# Patient Record
Sex: FEMALE | Race: WHITE | NOT HISPANIC OR LATINO | Employment: UNEMPLOYED | URBAN - METROPOLITAN AREA
[De-identification: names, ages, dates, MRNs, and addresses within clinical notes are randomized per-mention and may not be internally consistent; named-entity substitution may affect disease eponyms.]

---

## 2021-05-25 ENCOUNTER — HOSPITAL ENCOUNTER (EMERGENCY)
Facility: MEDICAL CENTER | Age: 26
End: 2021-05-25
Attending: EMERGENCY MEDICINE

## 2021-05-25 ENCOUNTER — APPOINTMENT (OUTPATIENT)
Dept: RADIOLOGY | Facility: MEDICAL CENTER | Age: 26
End: 2021-05-25
Attending: EMERGENCY MEDICINE

## 2021-05-25 VITALS
BODY MASS INDEX: 38.18 KG/M2 | SYSTOLIC BLOOD PRESSURE: 129 MMHG | TEMPERATURE: 97.3 F | HEART RATE: 73 BPM | DIASTOLIC BLOOD PRESSURE: 82 MMHG | HEIGHT: 60 IN | OXYGEN SATURATION: 96 % | RESPIRATION RATE: 16 BRPM | WEIGHT: 194.45 LBS

## 2021-05-25 DIAGNOSIS — J37.0 LARYNGITIS, CHRONIC: ICD-10-CM

## 2021-05-25 DIAGNOSIS — R13.10 ODYNOPHAGIA: ICD-10-CM

## 2021-05-25 LAB
ALBUMIN SERPL BCP-MCNC: 4.5 G/DL (ref 3.2–4.9)
ALBUMIN/GLOB SERPL: 1.5 G/DL
ALP SERPL-CCNC: 87 U/L (ref 30–99)
ALT SERPL-CCNC: 16 U/L (ref 2–50)
ANION GAP SERPL CALC-SCNC: 11 MMOL/L (ref 7–16)
AST SERPL-CCNC: 18 U/L (ref 12–45)
BASOPHILS # BLD AUTO: 0.4 % (ref 0–1.8)
BASOPHILS # BLD: 0.05 K/UL (ref 0–0.12)
BILIRUB SERPL-MCNC: 0.2 MG/DL (ref 0.1–1.5)
BUN SERPL-MCNC: 14 MG/DL (ref 8–22)
CALCIUM SERPL-MCNC: 9.1 MG/DL (ref 8.4–10.2)
CHLORIDE SERPL-SCNC: 105 MMOL/L (ref 96–112)
CO2 SERPL-SCNC: 23 MMOL/L (ref 20–33)
CREAT SERPL-MCNC: 0.72 MG/DL (ref 0.5–1.4)
EOSINOPHIL # BLD AUTO: 0.14 K/UL (ref 0–0.51)
EOSINOPHIL NFR BLD: 1.3 % (ref 0–6.9)
ERYTHROCYTE [DISTWIDTH] IN BLOOD BY AUTOMATED COUNT: 45.8 FL (ref 35.9–50)
GLOBULIN SER CALC-MCNC: 3.1 G/DL (ref 1.9–3.5)
GLUCOSE SERPL-MCNC: 87 MG/DL (ref 65–99)
HCT VFR BLD AUTO: 47.1 % (ref 37–47)
HGB BLD-MCNC: 15.4 G/DL (ref 12–16)
IMM GRANULOCYTES # BLD AUTO: 0.05 K/UL (ref 0–0.11)
IMM GRANULOCYTES NFR BLD AUTO: 0.4 % (ref 0–0.9)
LYMPHOCYTES # BLD AUTO: 2.6 K/UL (ref 1–4.8)
LYMPHOCYTES NFR BLD: 23.4 % (ref 22–41)
MCH RBC QN AUTO: 30.3 PG (ref 27–33)
MCHC RBC AUTO-ENTMCNC: 32.7 G/DL (ref 33.6–35)
MCV RBC AUTO: 92.5 FL (ref 81.4–97.8)
MONOCYTES # BLD AUTO: 0.72 K/UL (ref 0–0.85)
MONOCYTES NFR BLD AUTO: 6.5 % (ref 0–13.4)
NEUTROPHILS # BLD AUTO: 7.56 K/UL (ref 2–7.15)
NEUTROPHILS NFR BLD: 68 % (ref 44–72)
NRBC # BLD AUTO: 0 K/UL
NRBC BLD-RTO: 0 /100 WBC
PLATELET # BLD AUTO: 207 K/UL (ref 164–446)
PMV BLD AUTO: 11.1 FL (ref 9–12.9)
POTASSIUM SERPL-SCNC: 4.5 MMOL/L (ref 3.6–5.5)
PROT SERPL-MCNC: 7.6 G/DL (ref 6–8.2)
RBC # BLD AUTO: 5.09 M/UL (ref 4.2–5.4)
SODIUM SERPL-SCNC: 139 MMOL/L (ref 135–145)
WBC # BLD AUTO: 11.1 K/UL (ref 4.8–10.8)

## 2021-05-25 PROCEDURE — 80053 COMPREHEN METABOLIC PANEL: CPT

## 2021-05-25 PROCEDURE — 85025 COMPLETE CBC W/AUTO DIFF WBC: CPT

## 2021-05-25 PROCEDURE — 70491 CT SOFT TISSUE NECK W/DYE: CPT

## 2021-05-25 PROCEDURE — 700117 HCHG RX CONTRAST REV CODE 255: Performed by: EMERGENCY MEDICINE

## 2021-05-25 PROCEDURE — 99283 EMERGENCY DEPT VISIT LOW MDM: CPT

## 2021-05-25 PROCEDURE — 36415 COLL VENOUS BLD VENIPUNCTURE: CPT

## 2021-05-25 RX ORDER — OMEPRAZOLE 40 MG/1
40 CAPSULE, DELAYED RELEASE ORAL DAILY
Qty: 30 CAPSULE | Refills: 1 | Status: SHIPPED | OUTPATIENT
Start: 2021-05-25

## 2021-05-25 RX ORDER — ACETAMINOPHEN AND CODEINE PHOSPHATE 120; 12 MG/5ML; MG/5ML
1 SOLUTION ORAL EVERY EVENING
COMMUNITY

## 2021-05-25 RX ORDER — ALBUTEROL SULFATE 90 UG/1
2 AEROSOL, METERED RESPIRATORY (INHALATION) EVERY 6 HOURS PRN
COMMUNITY

## 2021-05-25 RX ORDER — BENZONATATE 200 MG/1
200 CAPSULE ORAL 3 TIMES DAILY PRN
Qty: 30 CAPSULE | Refills: 0 | Status: SHIPPED | OUTPATIENT
Start: 2021-05-25

## 2021-05-25 RX ADMIN — IOHEXOL 80 ML: 350 INJECTION, SOLUTION INTRAVENOUS at 10:28

## 2021-05-25 NOTE — ED NOTES
Med rec updated and complete  Allergies reviewed  Interviewed pt with mother at bedside with permission from pt.  Pt reports no vitamins or OTC's.  Pt reports no antibiotics in the last 2 weeks       No current facility-administered medications on file prior to encounter.     Current Outpatient Medications on File Prior to Encounter   Medication Sig Dispense Refill   • albuterol 108 (90 Base) MCG/ACT Aero Soln inhalation aerosol Inhale 2 Puffs every 6 hours as needed for Shortness of Breath.     • norethindrone (MICRONOR) 0.35 MG tablet Take 1 tablet by mouth every evening.

## 2021-05-25 NOTE — ED PROVIDER NOTES
ED Provider Note    Primary care provider: Pcp Pt States None  Means of arrival: Walk-in  History obtained from: Patient    CHIEF COMPLAINT  Chief Complaint   Patient presents with   • Hoarse   • Sore Throat   • Cough     Seen at 9:37 AM.   HPI  Carin Rae is a 25 y.o. female who presents to the Emergency Department for intermittent waxing waning sore throat, loss of voice for the past year.  Sometimes when she coughs she gets a frontal headache.  She does not note any modifying factors to this.  She does smoke cigarettes, she states that she stopped smoking for a day and it did not improve it, therefore she went back to smoking again.  She denies any fevers, chills, night sweats or weight loss.  She denies any chest pain, shortness of breath.  She does have some intermittent acid reflux.  She is status post tonsil and adenoidectomy.  She does not have insurance.    REVIEW OF SYSTEMS  See HPI,   Remainder of ROS negative.     PAST MEDICAL HISTORY   Denies    SURGICAL HISTORY   has a past surgical history that includes tonsillectomy and adenoidectomy; tonsillectomy; other; and myringotomy (1995).    SOCIAL HISTORY  Social History     Tobacco Use   • Smoking status: Current Every Day Smoker     Packs/day: 1.00     Types: Cigarettes   • Smokeless tobacco: Never Used   Vaping Use   • Vaping Use: Every day   • Substances: THC   • Devices: Disposable   Substance Use Topics   • Alcohol use: Not Currently   • Drug use: No      Social History     Substance and Sexual Activity   Drug Use No       FAMILY HISTORY  History reviewed. No pertinent family history.    CURRENT MEDICATIONS  Reviewed.  See Encounter Summary.     ALLERGIES  No Known Allergies    PHYSICAL EXAM  VITAL SIGNS: /82   Pulse 73   Temp 36.3 °C (97.3 °F) (Temporal)   Resp 16   Ht 1.524 m (5')   Wt 88.2 kg (194 lb 7.1 oz)   LMP 04/25/2021   SpO2 96%   BMI 37.98 kg/m²   Constitutional: Awake, alert in no apparent distress.  HENT:  Normocephalic, Bilateral external ears normal. Nose normal.  Tonsils surgically absent.  No postnasal drip.  No erythema.  No cervical lymphadenopathy.  The neck is supple.  Hoarse voice appreciated.  Eyes: Conjunctiva normal, non-icteric, EOMI.    Thorax & Lungs: Easy unlabored respirations, Clear to ascultation bilaterally.  Cardiovascular: Regular rate, Regular rhythm, No murmurs, rubs or gallops. Bilateral pulses symmetrical.   Abdomen:  Soft, nontender, nondistended, normal active bowel sounds.   :    Skin: Visualized skin is  Dry, No erythema, No rash.   Musculoskeletal:   No cyanosis, clubbing or edema. No leg asymmetry.   Neurologic: Alert, Grossly non-focal.   Psychiatric: Normal affect, Normal mood  Lymphatic:  No cervical LAD        RADIOLOGY  CT-SOFT TISSUE NECK WITH   Final Result         1. No mass or fluid collection seen in the neck.      2. Mildly prominent left level 2 lymph nodes.            COURSE & MEDICAL DECISION MAKING  Pertinent Labs & Imaging studies reviewed. (See chart for details)    Differential diagnoses include but are not limited to: Most likely acid reflux causing a chronic cough and inflammation of the vocal cords.  Other consideration would be HPV of the vocal cords, much less likely vocal cord dysplasia.    9:37 AM - Medical record reviewed, no visits in the past several years.  Status post tonsillectomy.    10:00 AM: I recommend placing the patient on a PPI, outpatient observation and look for improved symptoms after the next 2 to 3 weeks.  If she does not have any improvement, the next step would be rhinoscopy by ENT, unfortunately she does not have insurance, she would have to go to the main campus for ENT referral and follow-up.  The patient is very concerned about malignancy, explained this is very unlikely given that she is 25 years young.  Because the concern, lack of guarantee follow-up plan to soft tissue CT of the neck prior to discharge.    Decision Making:  This is  a pleasant 25 y.o. year old female who presents with odynophagia, cough, laryngitis for the past year.  Differential as above.  I am most suspicious of GERD due to the smoking history.  The patient was very concerned about malignancy, therefore soft tissue neck was done that is relatively unremarkable.  Plan will be to place the patient on a PPI, I strongly encourage smoking cessation.  If she has no improvement in symptoms after 3 weeks of this it would be reasonable to follow-up with ENT for rhinoscopy, unfortunately we are not able to refer from [facility], therefore she will have to go to [facility] to obtain a referral.  This does not need to happen emergently, therefore she was not sent up there today.    Discharge Medications:  Discharge Medication List as of 5/25/2021 11:20 AM      START taking these medications    Details   omeprazole (PRILOSEC) 40 MG delayed-release capsule Take 1 capsule by mouth every day., Disp-30 capsule, R-1, Normal      benzonatate (TESSALON) 200 MG capsule Take 1 capsule by mouth 3 times a day as needed for Cough., Disp-30 capsule, R-0, Normal             The patient was discharged home (see d/c instructions) was told to return immediately for any signs or symptoms listed, or any worsening at all.  The patient verbally agreed to the discharge precautions and follow-up plan which is documented in EPIC.        FINAL IMPRESSION  1. Odynophagia    2. Laryngitis, chronic

## 2021-05-25 NOTE — ED NOTES
"Patient states that her voice \"has gone out repeatedly over the last year, but sometimes will come back. It has been out for 4 months now, and started having burning occur 3-4 weeks now\"  "

## 2021-05-25 NOTE — ED TRIAGE NOTES
Pt ambulates back to room with mother  Chief Complaint   Patient presents with   • Hoarse   • Sore Throat   • Cough   symtoms x 1 year, worse then past 4 months  + smoker    Pt A & 0 x 4, speech clear, ambulates well    Pt oriented to room, call light within reach, shahid in lowest position

## 2021-05-25 NOTE — ED NOTES
Dc instructions and medications discussed with patient at bedside. All questions answered at this time. VSS. No IV in place at this time. Pt to lobby without incident. mother to drive patient home.

## 2021-08-23 PROBLEM — S82.201D: Status: ACTIVE | Noted: 2021-08-23
